# Patient Record
Sex: FEMALE | URBAN - METROPOLITAN AREA
[De-identification: names, ages, dates, MRNs, and addresses within clinical notes are randomized per-mention and may not be internally consistent; named-entity substitution may affect disease eponyms.]

---

## 2024-01-23 ENCOUNTER — E-VISIT (OUTPATIENT)
Dept: ADMINISTRATIVE | Facility: OTHER | Age: 29
End: 2024-01-23

## 2024-01-23 NOTE — E-VISIT ESCALATED
Chief Complaint: Migraine or headache   Patient was shown the following escalation message:   Some conditions need a visit with a healthcare provider   Because you're having headaches so often, you should speak with a healthcare provider to get care.   ----------   Patient Interview Transcript:   Where do you feel your headache pain? Select one.    On one side of my head (including forehead or back of head)   Not selected:    On both sides of my head (or all over)    I'm not sure   How long have you had this headache? Select one.    More than 3 days   Not selected:    Less than 30 minutes    30 minutes to 3 hours    4 hours to 3 days    I don't currently have a headache   How would you describe your headache? Select all that apply.    Dull pain    It's throbbing or pulsating   Not selected:    Band-like pressure or tightening    A feeling of head fullness    Pressure-like pain    It feels like I'm wearing a tight cap    None of the above   On a scale of 1 to 10, how severe is your headache pain? If you have a very severe headache, you may need to be seen in person. Select one.    Severe; intense pain that prevents me from doing some everyday activities   Not selected:    Mild; pain is noticeable and distracting, but I can do everyday activities    Moderate; strong pain that makes everyday activities harder    _Unbearable; intense pain that prevents me from doing anything _    The worst headache of my life   How long did it take for your headache to reach the greatest intensity? The pain of most headaches builds gradually. In rare cases, headache pain comes on suddenly, like a clap of thunder or an explosion, and the pain reaches greatest intensity within 1 minute. Select one.    1 to 5 minutes   Not selected:    Less than 1 minute    5 to 30 minutes    30 to 60 minutes    Longer than 1 hour   Do you currently have any of these symptoms? Select all that apply.    Sensitivity to light    Sensitivity to noise     Headache pain that's worse with physical activity (walking or climbing stairs)   Not selected:    Nausea    Vomiting    Decreased ability to work, study, or do regular daily activities for at least one day    None of the above   Are any of these true for your headache? Select all that apply.    It's worse when I'm upright (and better when I'm lying down)   Not selected:    It's gotten better, but it hasn't gone away    It went away, but then it came back    It wakes me from sleep    It's worse in the morning    It's worse when I bend over    None of the above   Do you currently have any of these symptoms? Select all that apply.    Pain around the eyes or cheeks   Not selected:    Fever    Nasal congestion    Thick yellow or green nasal drainage (mucus)    None of these   Do you currently have any of these symptoms? Select all that apply.    None of the above   Not selected:    Confusion    Difficulty staying awake or alert    Difficulty speaking or understanding speech    Difficulty walking    Numbness, tingling, or weakness in any of your limbs that doesn't go away    Seizures    Severe jaw pain    Painful rash that looks like a band of blisters on the same side of your head or face as your headache   Headaches can be a sign of a serious underlying condition. Within the last month, have you had any of these symptoms? Select all that apply.    None of the above   Not selected:    Unexplained fever (no clear source or obvious infection)    Recurring night sweats    Severe fatigue that doesn't improve with rest    Unintentional weight loss   Have you had any of these vision changes? Select all that apply.    None of the above   Not selected:    Blurry vision when moving your chin toward your chest    Loss of peripheral vision (can't see out of the corners of the eyes)    Double vision    Severe difficulty seeing    Seeing halos (bright circles) around lights    Complete loss of vision in one eye   Sore or tight  "muscles in the neck and shoulders can play a part in causing headaches. Gently press against your head and neck muscles, making small circular movements on the muscles. Do this on both your right and left sides.    Forehead   Not selected:    Jaw    Back of head    Neck    Upper shoulder    None of the above   Is your headache pain worse with Valsalva maneuvers? A common Valsalva maneuver involves closing your mouth, plugging your nose shut, and breathing out gently in a way that \"pops\" your ears. Bearing down as if having a bowel movement is another example of a Valsalva maneuver. Select one.    I'm not sure   Not selected:    Yes    No   Can you move your chin toward your chest?    Yes   Not selected:    No, my neck is too stiff   We'd like you to perform three simple tasks: 1. Get up from a seated position without using your arms (or anyone's help) to push yourself up 2. Walk on your toes for 10 steps 3. Walk on your heels for 10 steps Can you perform all three tasks? Select one.    Yes   Not selected:    No, I can't do one or more of the tasks   Do any of these seem to trigger your headache symptoms? Select all that apply.    Bright lights or visual stimuli   Not selected:    Alcohol    Artificial sweeteners (aspartame)    Certain foods, such as cured meats or cheese    Certain smells    Changes in sleep pattern    Hunger    Stress    Weather changes    No, not that I know of   Does your headache only occur during or after intense physical activity (jogging, weight lifting, or having sex)? Select one.    No   Not selected:    Yes   In the last 2 weeks, have you had any kind of injury or trauma to your head? Examples of injuries might include hitting your head or being hit in the head. Select one.    No   Not selected:    Yes   In the last month, have you had any known exposure to toxic chemicals? For example, this might include accidentally swallowing antifreeze or inhaling paint fumes. Select one.    No, not " that I know of   Not selected:    Yes   Have you had any recent exposure to carbon monoxide? Common sources of carbon monoxide include motor vehicle exhaust, smoke from fires, engine fumes, and non-electric heaters. Select one.    No, not that I know of   Not selected:    Yes   Have you ever had a similar headache in the past? By similar, we mean the location of the pain is the same, the headache feels the same, and the associated symptoms are the same. Select one.    Yes   Not selected:    No   Does it feel like your headaches have gotten worse? For example, are they coming on faster, happening more often, or becoming more painful? Select one.    Yes, definitely   Not selected:    No, not necessarily    I'm not sure   How have your headaches gotten worse? Select all that apply.    I have more frequent headaches    My headaches are coming on more quickly    My headache pain is getting more intense   Not selected:    My headaches are interfering more with my ability to do everyday activities    Other (specify)   Over the past month, have you had a headache every day? Select one.    No   Not selected:    Yes   Over the past 3 months, how many days per month have you had a headache? Select one.    15 or more days per month   Not selected:    1 day or less per month    2 to 14 days per month   ----------   Medical history   Medical history data does not currently exist for this patient.